# Patient Record
Sex: FEMALE | Race: OTHER | HISPANIC OR LATINO | URBAN - METROPOLITAN AREA
[De-identification: names, ages, dates, MRNs, and addresses within clinical notes are randomized per-mention and may not be internally consistent; named-entity substitution may affect disease eponyms.]

---

## 2022-02-28 ENCOUNTER — EMERGENCY (EMERGENCY)
Facility: HOSPITAL | Age: 87
LOS: 1 days | Discharge: HOME | End: 2022-02-28
Attending: EMERGENCY MEDICINE
Payer: SELF-PAY

## 2022-02-28 VITALS
SYSTOLIC BLOOD PRESSURE: 162 MMHG | RESPIRATION RATE: 18 BRPM | DIASTOLIC BLOOD PRESSURE: 79 MMHG | HEART RATE: 79 BPM | TEMPERATURE: 99 F | OXYGEN SATURATION: 98 %

## 2022-02-28 VITALS
RESPIRATION RATE: 18 BRPM | TEMPERATURE: 99 F | HEART RATE: 92 BPM | OXYGEN SATURATION: 94 % | DIASTOLIC BLOOD PRESSURE: 79 MMHG | SYSTOLIC BLOOD PRESSURE: 183 MMHG

## 2022-02-28 LAB
ALBUMIN SERPL ELPH-MCNC: 4.2 G/DL — SIGNIFICANT CHANGE UP (ref 3.3–5.2)
ALP SERPL-CCNC: 163 U/L — HIGH (ref 40–120)
ALT FLD-CCNC: 12 U/L — SIGNIFICANT CHANGE UP
ANION GAP SERPL CALC-SCNC: 9 MMOL/L — SIGNIFICANT CHANGE UP (ref 5–17)
AST SERPL-CCNC: 16 U/L — SIGNIFICANT CHANGE UP
BASOPHILS # BLD AUTO: 0.07 K/UL — SIGNIFICANT CHANGE UP (ref 0–0.2)
BASOPHILS NFR BLD AUTO: 0.8 % — SIGNIFICANT CHANGE UP (ref 0–2)
BILIRUB SERPL-MCNC: 0.5 MG/DL — SIGNIFICANT CHANGE UP (ref 0.4–2)
BUN SERPL-MCNC: 15.6 MG/DL — SIGNIFICANT CHANGE UP (ref 8–20)
CALCIUM SERPL-MCNC: 9.3 MG/DL — SIGNIFICANT CHANGE UP (ref 8.6–10.2)
CHLORIDE SERPL-SCNC: 100 MMOL/L — SIGNIFICANT CHANGE UP (ref 98–107)
CO2 SERPL-SCNC: 30 MMOL/L — HIGH (ref 22–29)
CREAT SERPL-MCNC: 0.66 MG/DL — SIGNIFICANT CHANGE UP (ref 0.5–1.3)
EGFR: 86 ML/MIN/1.73M2 — SIGNIFICANT CHANGE UP
EOSINOPHIL # BLD AUTO: 0.15 K/UL — SIGNIFICANT CHANGE UP (ref 0–0.5)
EOSINOPHIL NFR BLD AUTO: 1.7 % — SIGNIFICANT CHANGE UP (ref 0–6)
GLUCOSE SERPL-MCNC: 128 MG/DL — HIGH (ref 70–99)
HCT VFR BLD CALC: 40.9 % — SIGNIFICANT CHANGE UP (ref 34.5–45)
HGB BLD-MCNC: 14.3 G/DL — SIGNIFICANT CHANGE UP (ref 11.5–15.5)
IMM GRANULOCYTES NFR BLD AUTO: 0.1 % — SIGNIFICANT CHANGE UP (ref 0–1.5)
LYMPHOCYTES # BLD AUTO: 2.73 K/UL — SIGNIFICANT CHANGE UP (ref 1–3.3)
LYMPHOCYTES # BLD AUTO: 30.8 % — SIGNIFICANT CHANGE UP (ref 13–44)
MAGNESIUM SERPL-MCNC: 1.5 MG/DL — LOW (ref 1.8–2.6)
MCHC RBC-ENTMCNC: 32.1 PG — SIGNIFICANT CHANGE UP (ref 27–34)
MCHC RBC-ENTMCNC: 35 GM/DL — SIGNIFICANT CHANGE UP (ref 32–36)
MCV RBC AUTO: 91.7 FL — SIGNIFICANT CHANGE UP (ref 80–100)
MONOCYTES # BLD AUTO: 0.5 K/UL — SIGNIFICANT CHANGE UP (ref 0–0.9)
MONOCYTES NFR BLD AUTO: 5.6 % — SIGNIFICANT CHANGE UP (ref 2–14)
NEUTROPHILS # BLD AUTO: 5.4 K/UL — SIGNIFICANT CHANGE UP (ref 1.8–7.4)
NEUTROPHILS NFR BLD AUTO: 61 % — SIGNIFICANT CHANGE UP (ref 43–77)
PLATELET # BLD AUTO: 301 K/UL — SIGNIFICANT CHANGE UP (ref 150–400)
POTASSIUM SERPL-MCNC: 4.4 MMOL/L — SIGNIFICANT CHANGE UP (ref 3.5–5.3)
POTASSIUM SERPL-SCNC: 4.4 MMOL/L — SIGNIFICANT CHANGE UP (ref 3.5–5.3)
PROT SERPL-MCNC: 7 G/DL — SIGNIFICANT CHANGE UP (ref 6.6–8.7)
RBC # BLD: 4.46 M/UL — SIGNIFICANT CHANGE UP (ref 3.8–5.2)
RBC # FLD: 12.8 % — SIGNIFICANT CHANGE UP (ref 10.3–14.5)
SODIUM SERPL-SCNC: 139 MMOL/L — SIGNIFICANT CHANGE UP (ref 135–145)
TROPONIN T SERPL-MCNC: <0.01 NG/ML — SIGNIFICANT CHANGE UP (ref 0–0.06)
WBC # BLD: 8.86 K/UL — SIGNIFICANT CHANGE UP (ref 3.8–10.5)
WBC # FLD AUTO: 8.86 K/UL — SIGNIFICANT CHANGE UP (ref 3.8–10.5)

## 2022-02-28 PROCEDURE — 71045 X-RAY EXAM CHEST 1 VIEW: CPT | Mod: 26

## 2022-02-28 PROCEDURE — 83735 ASSAY OF MAGNESIUM: CPT

## 2022-02-28 PROCEDURE — 71045 X-RAY EXAM CHEST 1 VIEW: CPT

## 2022-02-28 PROCEDURE — 84484 ASSAY OF TROPONIN QUANT: CPT

## 2022-02-28 PROCEDURE — 73030 X-RAY EXAM OF SHOULDER: CPT

## 2022-02-28 PROCEDURE — 93010 ELECTROCARDIOGRAM REPORT: CPT

## 2022-02-28 PROCEDURE — 85025 COMPLETE CBC W/AUTO DIFF WBC: CPT

## 2022-02-28 PROCEDURE — 36415 COLL VENOUS BLD VENIPUNCTURE: CPT

## 2022-02-28 PROCEDURE — 73030 X-RAY EXAM OF SHOULDER: CPT | Mod: 26,LT

## 2022-02-28 PROCEDURE — 80053 COMPREHEN METABOLIC PANEL: CPT

## 2022-02-28 PROCEDURE — 99285 EMERGENCY DEPT VISIT HI MDM: CPT

## 2022-02-28 PROCEDURE — 93005 ELECTROCARDIOGRAM TRACING: CPT

## 2022-02-28 PROCEDURE — 99285 EMERGENCY DEPT VISIT HI MDM: CPT | Mod: 25

## 2022-02-28 RX ORDER — IBUPROFEN 200 MG
400 TABLET ORAL ONCE
Refills: 0 | Status: COMPLETED | OUTPATIENT
Start: 2022-02-28 | End: 2022-02-28

## 2022-02-28 RX ADMIN — Medication 400 MILLIGRAM(S): at 14:12

## 2022-02-28 NOTE — ED ADULT NURSE NOTE - NSIMPLEMENTINTERV_GEN_ALL_ED
Implemented All Universal Safety Interventions:  [place] to call system. Call bell, personal items and telephone within reach. Instruct patient to call for assistance. Room bathroom lighting operational. Non-slip footwear when patient is off stretcher. Physically safe environment: no spills, clutter or unnecessary equipment. Stretcher in lowest position, wheels locked, appropriate side rails in place.

## 2022-02-28 NOTE — ED PROVIDER NOTE - CARE PROVIDER_API CALL
Harpreet Garcia)  Orthopedics  [address] [place] [id number]  Phone: [phone]  Fax: [phone]  Follow Up Time: 1-3 Days

## 2022-02-28 NOTE — ED PROVIDER NOTE - OBJECTIVE STATEMENT
86yoF; with PMH signif for Thymus mass(s/p resection), HTN, HLD, DM; now p/w left arm pain radiating to left chest wall, worse with movement.  denies numbness/tingling. dneies focal weakness. denies cp/sob/palp.  denies f/c/s. denies cough. denies trauma.  PMH:  Thymus mass(s/p resection), HTN, HLD, DM  SOCIAL: denies smoking / denies illicit substance use

## 2022-02-28 NOTE — ED ADULT NURSE NOTE - OBJECTIVE STATEMENT
AxOx3. Pt c/o of left arm pain. Pt states she has had this pain for over a month but last night the pain was at its worst and decided to come to ED. Grandson at bedside.

## 2022-02-28 NOTE — ED PROVIDER NOTE - NS ED ROS FT
Constitutional: (-) fever  (-)chills  (-)sweats  Eyes/ENT: (-)   Cardiovascular: (-) chest pain, (-) palpitations (-) edema   Respiratory: (-) cough, (-) shortness of breath   Gastrointestinal: (-)nausea  (-)vomiting, (-) diarrhea  (-) abdominal pain   :  (-)dysuria, (-)frequency, (-)urgency, (-)hematuria  Musculoskeletal: (-) neck pain, (-) back pain, (+) joint pain  Integumentary: (-) rash, (-) edema  Neurological: (-) headache, (-) altered mental status  (-)LOC

## 2022-02-28 NOTE — ED PROVIDER NOTE - PATIENT PORTAL LINK FT
You can access the FollowMyHealth Patient Portal offered by [facility] by registering at the following website: [email or web address]. By joining [facility]’s FollowMyHealth portal, you will also be able to view your health information using other applications (apps) compatible with our system.

## 2022-02-28 NOTE — ED ADULT TRIAGE NOTE - CHIEF COMPLAINT QUOTE
pt c/o left arm pain to upper shoulder to armpit, pain started x 1 month, unable to raise left arm  A&Ox3, resp wnl, pt c/o left arm pain to upper shoulder to armpit, pain started x 1 month, unable to raise left arm, no known inury  A&Ox3, resp wnl,

## 2022-02-28 NOTE — ED ADULT TRIAGE NOTE - DOMESTIC TRAVEL HIGH RISK QUESTION
Patient awake and alert, watching television with mother at the bedside. Awaiting disposition, will continue to monitor. No

## 2022-02-28 NOTE — ED PROVIDER NOTE - CLINICAL SUMMARY MEDICAL DECISION MAKING FREE TEXT BOX
L shoulder pain: xray with evidence or arthritic changes, will give f/up with ortho. cardiac w/up negative.

## 2022-02-28 NOTE — ED ADULT NURSE NOTE - CAS ELECT INFOMATION PROVIDED
DC instructions given to pt, verbalized understanding. Pt ambulated off unit in no acute distress./DC instructions

## 2022-02-28 NOTE — ED ADULT NURSE NOTE - CHIEF COMPLAINT QUOTE
pt c/o left arm pain to upper shoulder to armpit, pain started x 1 month, unable to raise left arm, no known inury  A&Ox3, resp wnl,

## 2022-02-28 NOTE — ED PROVIDER NOTE - NSFOLLOWUPINSTRUCTIONS_ED_ALL_ED_FT
Dolor de hombro    LO QUE NECESITA SABER:    El dolor de hombro es un problema común que puede afectar mimi actividades diarias. El dolor puede ser causado por un problema en comer hombro, naty la irritación de un tendón o bursa. Los tendones son cuerdas de tejido resistente que conectan los músculos a los huesos. La bursa es alona bolsa (saco) llena de líquido que actúa naty colchón entre un hueso y un tendón. El dolor de hombro también puede ser causado por el dolor que se propaga hacia comer hombro de otra parte de comer cuerpo.    Anatomía del hombro         INSTRUCCIONES SOBRE EL REGLA HOSPITALARIA:    Regrese a la geneva de emergencias si:  •Usted tiene dolor intenso.      •Usted no puede  comer brazo ni comer hombro.      •Usted tiene entumecimiento u hormigueo en el hombro o en el brazo.      Comuníquese con comer médico si:  •Comer dolor empeora o no desaparece con el tratamiento.      •Usted tiene dificultad para  el brazo o el hombro.      •Usted tiene preguntas o inquietudes acerca de comer condición o cuidado.      Medicamentos:Es posible que usted necesite alguno de los siguientes:   •Acetaminofénalivia el dolor y baja la fiebre. Está disponible sin receta médica. Pregunte la cantidad y la frecuencia con que debe tomarlos. Siga las indicaciones. Xochitl las etiquetas de todos los demás medicamentos que esté usando para saber si también contienen acetaminofén, o pregunte a comer médico o farmacéutico. El acetaminofén puede causar daño en el hígado cuando no se eliza de forma correcta. No use más de 4 gramos (4000 miligramos) en total de acetaminofeno en un día.      •Los BONG,naty el ibuprofeno, ayudan a disminuir la inflamación, el dolor y la fiebre. Jenni medicamento está disponible con o sin alona receta médica. Los BONG pueden causar sangrado estomacal o problemas renales en ciertas personas. Si usted eliza un medicamento anticoagulante, siempre pregúntele a comer médico si los BONG son seguros para usted. Siempre xochitl la etiqueta de jenni medicamento y siga las instrucciones.      •[place] mimi medicamentos naty se le haya indicado.Consulte con comer médico si usted angeles que comer medicamento no le está ayudando o si presenta efectos secundarios. Infórmele si es alérgico a algún medicamento. Mantenga alona lista actualizada de los medicamentos, las vitaminas y los productos herbales que eliza. Incluya los siguientes datos de los medicamentos: cantidad, frecuencia y motivo de administración. Traiga con usted la lista o los envases de las píldoras a mimi citas de seguimiento. Lleve la lista de los medicamentos con usted en christopher de alona emergencia.      El manejo de mimi síntomas:  •Aplique hieloen el hombro de 20 a 30 minutos cada 2 horas o naty se lo indiquen. Use alona compresa de hielo o ponga hielo triturado en alona bolsa de plástico. Cúbrala con alona toalla antes de aplicarla sobre el hombro. El hielo ayuda a evitar daño al tejido y a disminuir la inflamación y el dolor.      •Use calor si el hielo no le está ayudando con mimi síntomas.Aplique calor sobre el hombro bonnie 20 a 30 minutos cada 2 horas bonnie tantos días naty le indiquen. El calor ayuda a disminuir el dolor y los espasmos musculares.      •Limite las actividades naty se le indique.Trate de evitar movimientos repetidos arriba de la meliza.      •Asista a terapia física u ocupacional naty se le indique.Un fisioterapeuta le puede enseñar ejercicios para ayudarle a mejorar el movimiento y la fuerza, y para disminuir el dolor. Un terapeuta ocupacional le enseña habilidades para ayudarlo con mimi actividades diarias.      Prevenga el dolor de hombro:  •Mantenga un buen rango de movimiento en el hombro.Pregúntele a comer médico qué ejercicios debe hacer de forma regular después de kassandra sanado.      •Haciendo ejercicios de estiramiento y fortalecimiento para comer hombro.Use la técnica apropiada bonnie los ejercicios y deportes.      Kaleb alona joann de seguimiento con comer médico u ortopedista naty se le indique:Anote mimi preguntas para que se acuerde de hacerlas bonnie mimi visitas.

## 2022-02-28 NOTE — ED PROVIDER NOTE - PHYSICAL EXAMINATION
General:     NAD  Head:     NC/AT, EOMI, oral mucosa moist  Neck:     trachea midline  Lungs:     CTA b/l, no w/r/r  CVS:     S1S2, RRR, no m/g/r  Abd:     +BS, s/nt/nd, no organomegaly  Ext:    2+ radial and pedal pulses, no c/c/e  L UE:  pain with abduction of shoulde, from/nt @ elbow, wrist. distal pulses intact.   Neuro: AAOx3, no sensory/motor deficits